# Patient Record
Sex: MALE | Race: BLACK OR AFRICAN AMERICAN | NOT HISPANIC OR LATINO | URBAN - METROPOLITAN AREA
[De-identification: names, ages, dates, MRNs, and addresses within clinical notes are randomized per-mention and may not be internally consistent; named-entity substitution may affect disease eponyms.]

---

## 2020-10-02 ENCOUNTER — WEB ENCOUNTER (OUTPATIENT)
Dept: URBAN - METROPOLITAN AREA CLINIC 35 | Facility: CLINIC | Age: 45
End: 2020-10-02

## 2020-10-06 ENCOUNTER — TELEPHONE ENCOUNTER (OUTPATIENT)
Dept: URBAN - METROPOLITAN AREA CLINIC 35 | Facility: CLINIC | Age: 45
End: 2020-10-06

## 2020-10-06 ENCOUNTER — OFFICE VISIT (OUTPATIENT)
Dept: URBAN - METROPOLITAN AREA CLINIC 31 | Facility: CLINIC | Age: 45
End: 2020-10-06

## 2020-10-06 VITALS
BODY MASS INDEX: 22.88 KG/M2 | DIASTOLIC BLOOD PRESSURE: 78 MMHG | TEMPERATURE: 96.1 F | HEIGHT: 75 IN | RESPIRATION RATE: 17 BRPM | WEIGHT: 184 LBS | SYSTOLIC BLOOD PRESSURE: 122 MMHG

## 2020-10-06 RX ORDER — SODIUM, POTASSIUM,MAG SULFATES 17.5-3.13G
177 ML SOLUTION, RECONSTITUTED, ORAL ORAL DAILY
Qty: 354 ML | Refills: 0 | OUTPATIENT
Start: 2020-10-06

## 2020-10-06 NOTE — EXAM-MIGRATED EXAMINATIONS
GENERAL APPEARANCE: - alert, in no acute distress, well developed, well nourished;   HEAD: - normocephalic, atraumatic;   EYES: - sclera anicteric bilaterally;   EARS: - normal;   ORAL CAVITY: - mucosa moist;   THROAT: - clear;   NECK/THYROID: - neck supple, full range of motion, no cervical lymphadenopathy, no thyroid nodules, no thyromegaly, trachea midline;   LYMPH NODES: - no cervical adenopathy, no supraclavicular adenopathy, no periumbilical adenopathy;   SKIN: - no suspicious lesions, warm and dry, no spider angiomata, palmar erythema or icterus;   HEART: - no murmurs, regular rate and rhythm, S1, S2 normal;   LUNGS: - clear to auscultation bilaterally, good air movement, no wheezes, rales, rhonchi;   ABDOMEN: - bowel sounds present, no masses palpable, no organomegaly , no rebound tenderness, soft, nontender, nondistended;   RECTAL: - deferred by patient;   MUSCULOSKELETAL: - normal posture, normal gait and station, no decreased range of motion;   EXTREMITIES: - no clubbing, cyanosis, or edema;   NEUROLOGIC: - cranial nerves 2-12 grossly intact;   PSYCH: - cooperative with exam, mood/affect full range;

## 2020-10-06 NOTE — HPI-MIGRATED HPI
;   ;   ;     Decreased appetite : Admit decreased appetate over the past year.  Described as no desire to eat, but does feel hungry and will eat 2-4 meals per day.       He has been working from home since pandemic. Admit eating smaller portion meals during this time.  ;   Unintentional weight loss/weight gain : Admit weight loss with onset 1 year ago.  His typical weight range between 195 to 200 lbs.  He report weight was down to 185 lbs at annual physical in August 2020, which was a surprise to him.  Today he weighs 184 lbs.  He has been working from home since pandemic. Admit eating smaller portion meals during this time.  ;   Colorectal Cancer Screening : 45 year old male patient presents today at the request of PCP Dr. Samuels for a colorectal cancer screening consultation.  He currently admits 1 bowel movement every other day with normal and formed stools.  Denies melena, blood or mucus in stools.   He denies having a colonoscopy or sigmoidoscopy.  He denies a family history of colon, gastric, or esophageal cancer/polyps.;

## 2020-12-15 ENCOUNTER — TELEPHONE ENCOUNTER (OUTPATIENT)
Dept: URBAN - METROPOLITAN AREA CLINIC 35 | Facility: CLINIC | Age: 45
End: 2020-12-15

## 2020-12-18 ENCOUNTER — OFFICE VISIT (OUTPATIENT)
Dept: URBAN - METROPOLITAN AREA SURGERY CENTER 8 | Facility: SURGERY CENTER | Age: 45
End: 2020-12-18

## 2021-01-05 ENCOUNTER — OFFICE VISIT (OUTPATIENT)
Dept: URBAN - METROPOLITAN AREA CLINIC 31 | Facility: CLINIC | Age: 46
End: 2021-01-05

## 2021-01-05 ENCOUNTER — TELEPHONE ENCOUNTER (OUTPATIENT)
Dept: URBAN - METROPOLITAN AREA CLINIC 35 | Facility: CLINIC | Age: 46
End: 2021-01-05

## 2021-01-05 VITALS
WEIGHT: 180 LBS | TEMPERATURE: 96.4 F | HEIGHT: 75 IN | BODY MASS INDEX: 22.38 KG/M2 | SYSTOLIC BLOOD PRESSURE: 132 MMHG | DIASTOLIC BLOOD PRESSURE: 100 MMHG | HEART RATE: 83 BPM | OXYGEN SATURATION: 97 %

## 2021-01-05 RX ORDER — SODIUM, POTASSIUM,MAG SULFATES 17.5-3.13G
177 ML SOLUTION, RECONSTITUTED, ORAL ORAL DAILY
Qty: 354 ML | Refills: 0 | Status: ON HOLD | COMMUNITY
Start: 2020-10-06

## 2021-01-05 NOTE — HPI-MIGRATED HPI
;   ;   ;   ;     Decreased appetite : Admits improvement in appetite since his last office visit.    Last visit (10/06/2020) Admit decreased appetate over the past year.  Described as no desire to eat, but does feel hungry and will eat 2-4 meals per day.       He has been working from home since pandemic. Admit eating smaller portion meals during this time.  ;   Unintentional weight loss/weight gain : Patient currently admits continued unintentional weight loss since his last office visit. He currently weighs 180, and during his previous visit his weight was 184.    Last visit (10/06/2020) Admit weight loss with onset 1 year ago.  His typical weight range between 195 to 200 lbs.  He report weight was down to 185 lbs at annual physical in August 2020, which was a surprise to him.  Today he weighs 184 lbs.  He has been working from home since pandemic. Admit eating smaller portion meals during this time.  ;   Personal History of Colon/Rectal Polyp : Patient presents today for a follow up from his colonoscopy. Patient denies any complications after his procedure. He currently admits 1 bowel movement every other day with no strain.  Stools are formed. Patient denies any melena, blood or mucus in stools.;   Colorectal Cancer Screening : Last visit (10/06/2020) 45 year old male patient presents today at the request of PCP Dr. Samuels for a colorectal cancer screening consultation.  He currently admits 1 bowel movement every other day with normal and formed stools.  Denies melena, blood or mucus in stools.   He denies having a colonoscopy or sigmoidoscopy.  He denies a family history of colon, gastric, or esophageal cancer/polyps.;

## 2021-02-12 ENCOUNTER — OFFICE VISIT (OUTPATIENT)
Dept: URBAN - METROPOLITAN AREA SURGERY CENTER 8 | Facility: SURGERY CENTER | Age: 46
End: 2021-02-12

## 2021-02-23 ENCOUNTER — OFFICE VISIT (OUTPATIENT)
Dept: URBAN - METROPOLITAN AREA CLINIC 31 | Facility: CLINIC | Age: 46
End: 2021-02-23

## 2021-03-02 ENCOUNTER — OFFICE VISIT (OUTPATIENT)
Dept: URBAN - METROPOLITAN AREA CLINIC 31 | Facility: CLINIC | Age: 46
End: 2021-03-02

## 2021-03-02 VITALS
BODY MASS INDEX: 23.38 KG/M2 | OXYGEN SATURATION: 97 % | DIASTOLIC BLOOD PRESSURE: 88 MMHG | HEIGHT: 75 IN | SYSTOLIC BLOOD PRESSURE: 133 MMHG | HEART RATE: 68 BPM | WEIGHT: 188 LBS

## 2021-03-02 RX ORDER — SODIUM, POTASSIUM,MAG SULFATES 17.5-3.13G
177 ML SOLUTION, RECONSTITUTED, ORAL ORAL DAILY
Qty: 354 ML | Refills: 0 | Status: DISCONTINUED | COMMUNITY
Start: 2020-10-06

## 2021-03-02 NOTE — HPI-MIGRATED HPI
;   ;   ;     Decreased appetite : He denies any episodes of decreased appetite. He reports eating 2 meals per day with 1-2 snacks in between meals.    Last visit (01/05/2021) Admits improvement in appetite since his last office visit.    Last visit (10/06/2020) Admit decreased appetate over the past year.  Described as no desire to eat, but does feel hungry and will eat 2-4 meals per day.       He has been working from home since pandemic. Admit eating smaller portion meals during this time.  ;   Unintentional weight loss/weight gain : 45 Year old male patient presents today for a follow up of unintentional weight loss and to discuss the findings of his endoscopy and CT. He denies any complications after his procedure. His current weight is 188 and during his last visit (01/05/2021) was 180.      Last visit (01/05/2021) Patient currently admits continued unintentional weight loss since his last office visit. He currently weighs 180, and during his previous visit his weight was 184.    Last visit (10/06/2020) Admit weight loss with onset 1 year ago.  His typical weight range between 195 to 200 lbs.  He report weight was down to 185 lbs at annual physical in August 2020, which was a surprise to him.  Today he weighs 184 lbs.  He has been working from home since pandemic. Admit eating smaller portion meals during this time.  ;   Personal History of Colon/Rectal Polyp :    Last visit (01/05/2021) Patient presents today for a follow up from his colonoscopy. Patient denies any complications after his procedure. He currently admits 1 bowel movement every other day with no strain.  Stools are formed. Patient denies any melena, blood or mucus in stools.  Last visit (10/06/2020) 45 year old male patient presents today at the request of PCP Dr. Samuels for a colorectal cancer screening consultation.  He currently admits 1 bowel movement every other day with normal and formed stools.  Denies melena, blood or mucus in stools.   He denies having a colonoscopy or sigmoidoscopy.  He denies a family history of colon, gastric, or esophageal cancer/polyps.;

## 2021-03-31 ENCOUNTER — TELEPHONE ENCOUNTER (OUTPATIENT)
Dept: URBAN - METROPOLITAN AREA CLINIC 35 | Facility: CLINIC | Age: 46
End: 2021-03-31

## 2021-09-14 ENCOUNTER — OFFICE VISIT (OUTPATIENT)
Dept: URBAN - METROPOLITAN AREA CLINIC 31 | Facility: CLINIC | Age: 46
End: 2021-09-14

## 2021-09-14 ENCOUNTER — DASHBOARD ENCOUNTERS (OUTPATIENT)
Age: 46
End: 2021-09-14

## 2021-09-14 VITALS
DIASTOLIC BLOOD PRESSURE: 80 MMHG | BODY MASS INDEX: 23 KG/M2 | OXYGEN SATURATION: 98 % | HEART RATE: 71 BPM | WEIGHT: 185 LBS | HEIGHT: 75 IN | SYSTOLIC BLOOD PRESSURE: 120 MMHG

## 2021-09-14 PROBLEM — 712730008: Status: ACTIVE | Noted: 2021-01-05

## 2021-09-14 PROBLEM — 721703004: Status: ACTIVE | Noted: 2021-01-05

## 2021-09-14 PROBLEM — 267024001 ABNORMAL WEIGHT LOSS: Status: ACTIVE | Noted: 2021-01-05

## 2021-09-14 PROBLEM — 73861008: Status: ACTIVE | Noted: 2021-03-02

## 2021-09-14 NOTE — HPI-MIGRATED HPI
;   ;   ;     Decreased appetite : He admits improvment in appetite since his last visit.   Last visit (3/2/2021)  He denies any episodes of decreased appetite. He reports eating 2 meals per day with 1-2 snacks in between meals.    Last visit (01/05/2021) Admits improvement in appetite since his last office visit.    Last visit (10/06/2020) Admit decreased appetate over the past year.  Described as no desire to eat, but does feel hungry and will eat 2-4 meals per day.       He has been working from home since pandemic. Admit eating smaller portion meals during this time.;   Unintentional weight loss/weight gain : Patient presents today for a follow up of unintentional weight loss. He admits weight is stable since his last visit. Last visit he weighed 188 and today he weighs 185.   He had a CT completed on 3/8/2021 revealing no acute finding in the abd/pelvis. Bilateral renal cysts and too small to characterize left renal hypodensities and fatty infiltration of the liver.  He denies any new concerns at this time.   Last visit (3/2/2021)  45 Year old male patient presents today for a follow up of unintentional weight loss and to discuss the findings of his endoscopy and CT. He denies any complications after his procedure. His current weight is 188 and during his last visit (01/05/2021) was 180.      Last visit (01/05/2021) Patient currently admits continued unintentional weight loss since his last office visit. He currently weighs 180, and during his previous visit his weight was 184.    Last visit (10/06/2020) Admit weight loss with onset 1 year ago.  His typical weight range between 195 to 200 lbs.  He report weight was down to 185 lbs at annual physical in August 2020, which was a surprise to him.  Today he weighs 184 lbs.  He has been working from home since pandemic. Admit eating smaller portion meals during this time.;   Personal History of Colon/Rectal Polyp : Currently reports 1 bowel movement every other day without strain. His stools are formed without blood, mucus, or melena.   Surveillance Colonoscopy due 12/18/2025.    Last visit (01/05/2021) Patient presents today for a follow up from his colonoscopy. Patient denies any complications after his procedure. He currently admits 1 bowel movement every other day with no strain.  Stools are formed. Patient denies any melena, blood or mucus in stools.  Last visit (10/06/2020) 45 year old male patient presents today at the request of PCP Dr. Samuels for a colorectal cancer screening consultation.  He currently admits 1 bowel movement every other day with normal and formed stools.  Denies melena, blood or mucus in stools.   He denies having a colonoscopy or sigmoidoscopy.  He denies a family history of colon, gastric, or esophageal cancer/polyps.;